# Patient Record
Sex: FEMALE | Race: WHITE | NOT HISPANIC OR LATINO | URBAN - METROPOLITAN AREA
[De-identification: names, ages, dates, MRNs, and addresses within clinical notes are randomized per-mention and may not be internally consistent; named-entity substitution may affect disease eponyms.]

---

## 2022-10-10 ENCOUNTER — OFFICE VISIT (OUTPATIENT)
Dept: URBAN - METROPOLITAN AREA CLINIC 96 | Facility: CLINIC | Age: 42
End: 2022-10-10

## 2022-10-10 VITALS — HEIGHT: 66 IN

## 2022-10-10 RX ORDER — EVE PRIMROSE/LINOLEIC/G-LENIC 1000 MG
CAPSULE ORAL
Qty: 0 | Refills: 0 | Status: ACTIVE | COMMUNITY
Start: 1900-01-01

## 2022-10-10 RX ORDER — FEXOFENADINE HYDROCHLORIDE 180 MG/1
TAKE 1 TABLET (180 MG) BY ORAL ROUTE ONCE DAILY TABLET, FILM COATED ORAL 1
Qty: 0 | Refills: 0 | Status: ACTIVE | COMMUNITY
Start: 1900-01-01

## 2022-10-18 ENCOUNTER — OFFICE VISIT (OUTPATIENT)
Dept: URBAN - METROPOLITAN AREA TELEHEALTH 2 | Facility: TELEHEALTH | Age: 42
End: 2022-10-18
Payer: COMMERCIAL

## 2022-10-18 ENCOUNTER — DASHBOARD ENCOUNTERS (OUTPATIENT)
Age: 42
End: 2022-10-18

## 2022-10-18 VITALS — WEIGHT: 201 LBS | HEIGHT: 66 IN | TEMPERATURE: 98.3 F | BODY MASS INDEX: 32.3 KG/M2

## 2022-10-18 DIAGNOSIS — D50.8 ACQUIRED IRON DEFICIENCY ANEMIA DUE TO DECREASED ABSORPTION: ICD-10-CM

## 2022-10-18 DIAGNOSIS — K20.90 ESOPHAGITIS: ICD-10-CM

## 2022-10-18 PROBLEM — 87522002: Status: ACTIVE | Noted: 2022-10-18

## 2022-10-18 PROBLEM — 69686006: Status: ACTIVE | Noted: 2022-10-18

## 2022-10-18 PROBLEM — 235599003: Status: ACTIVE | Noted: 2022-10-18

## 2022-10-18 PROCEDURE — 99205 OFFICE O/P NEW HI 60 MIN: CPT | Performed by: INTERNAL MEDICINE

## 2022-10-18 PROCEDURE — 99204 OFFICE O/P NEW MOD 45 MIN: CPT | Performed by: INTERNAL MEDICINE

## 2022-10-18 RX ORDER — FEXOFENADINE HYDROCHLORIDE 180 MG/1
TAKE 1 TABLET (180 MG) BY ORAL ROUTE ONCE DAILY TABLET, FILM COATED ORAL 1
Qty: 0 | Refills: 0 | Status: ACTIVE | COMMUNITY
Start: 1900-01-01

## 2022-10-18 RX ORDER — EVE PRIMROSE/LINOLEIC/G-LENIC 1000 MG
CAPSULE ORAL
Qty: 0 | Refills: 0 | Status: ACTIVE | COMMUNITY
Start: 1900-01-01

## 2022-10-18 NOTE — HPI-TODAY'S VISIT:
This is a 42-year-old female referred by Dr Luis Manuel Pittman ([place]) for GI consultation for a 3rd opinon for regurgitation and gerd and a copy will be sent to the referring provider.  We do have records from her previous evaluations.  It looks like patient had a colonoscopy done by Dr. Elly Cline of [facility] on June 20, 2022.  This was done for iron deficiency anemia and revealed a 3 mm ascending colon polyp removed as well as internal hemorrhoids.  She recommended repeat exam in 5 years or June 2027.  Her last EGD done by Dr. Cline was June 24, 2022 and this revealed grade B esophagitis with a 2 cm hiatal hernia and a few polyps in the fundus.  Biopsies were sent butg I dont have these results. PRIOR- patient had an EGD by Dr. Cline on February 4, 2019 this revealed grade C esophagitis with a small superficial ulcer of the lower esophagus.  She also saw 3 cm hiatal hernia and gastritis.  Biopsies were sent.  I also see an esophageal manometry study done in October 2019 that showed an LES pressure of 17 with normal relaxation.  The residual LESP was 7.8.  There was aperistalsis of the 9 swallows that were attempted during the procedure.  Dr. Cline felt that this was consistent with scleroderma type esophageal motility disorder.  An EGD was done on October 12, 2016 by Dr. Antony Sánchez in our group and this showed grade a esophagitis and a 2 cm hiatal hernia.  Pathology showed increased eosinophils to a count of 9 consistent with GERD middle esophageal biopsies however showed 20 eosinophils per high-power field in the there was a question therefore of EOE.  I also seen EGD from 2016 by  where distal esophageal biopsy showed 20 eosinophils and proximal esophageal biopsy showed 1-2 eosinophils. Pt says the mystery is that she throws up a lot but when asked it seems more like regurgitation. She denies nausea or luis enrique vomiting.  Pt had a barium swallow done last week ordered by a general surgeon at [place] for possible fundoplication- Dr Sue.- she says she had seen a surgeon prior who said she might be a candidate but this surgeon was hesitant due to her prior motility study and history.  Pt c/o regurgitation throughout the day. She can have this with water even.  Pt is having heartburn and is on nexum now but was on dexilant in the past. Pt has not had a pillcam study, denies lower gi symptoms.  Has not seen a hematologist or rheum yet. Pt was referred to see Dr Nola Rubin at [facility] and has sent records but not gotten in yet.  Pt is a , says otherwise healthy.